# Patient Record
Sex: MALE | Race: BLACK OR AFRICAN AMERICAN | NOT HISPANIC OR LATINO | Employment: FULL TIME | URBAN - NONMETROPOLITAN AREA
[De-identification: names, ages, dates, MRNs, and addresses within clinical notes are randomized per-mention and may not be internally consistent; named-entity substitution may affect disease eponyms.]

---

## 2023-11-17 ENCOUNTER — OFFICE VISIT (OUTPATIENT)
Dept: FAMILY MEDICINE CLINIC | Facility: CLINIC | Age: 23
End: 2023-11-17
Payer: COMMERCIAL

## 2023-11-17 VITALS
TEMPERATURE: 97.8 F | RESPIRATION RATE: 18 BRPM | SYSTOLIC BLOOD PRESSURE: 120 MMHG | HEART RATE: 79 BPM | WEIGHT: 162.4 LBS | DIASTOLIC BLOOD PRESSURE: 62 MMHG | BODY MASS INDEX: 22.73 KG/M2 | HEIGHT: 71 IN | OXYGEN SATURATION: 98 %

## 2023-11-17 DIAGNOSIS — Z00.00 ENCOUNTER FOR WELL ADULT EXAM WITHOUT ABNORMAL FINDINGS: ICD-10-CM

## 2023-11-17 DIAGNOSIS — R19.7 DIARRHEA IN ADULT PATIENT: Primary | ICD-10-CM

## 2023-11-17 DIAGNOSIS — J30.89 ENVIRONMENTAL AND SEASONAL ALLERGIES: ICD-10-CM

## 2023-11-17 RX ORDER — LORATADINE 10 MG/1
10 TABLET ORAL DAILY
Qty: 30 TABLET | Refills: 2 | Status: SHIPPED | OUTPATIENT
Start: 2023-11-17

## 2023-11-17 NOTE — ASSESSMENT & PLAN NOTE
"Abdominal Pain: Patient complains of abdominal pain that started on Sunday and by Tuesday was resolving. The pain is located in the lower abdomen. The pain is described as cramping, and is 6/10 in intensity. Onset was 5 days ago with resolution today. Symptoms have been gradually improving since and patient denies any hematochezia. Aggravating factors, none known.  Alleviating factors, patient's grandmother gave him an over-the-counter \"acid reducer medicine\" and this seemed to help his cramping and lower abdominal pain.  Patient reported some mild loss of appetite but reports this is completely returned to normal.  Patient has no other symptoms denies any fever, chills, or other constitutional symptoms and is back to his regular daily activities.  Patient education materials attached to [facility] and reviewed with patient during his visit today about home management of diarrhea and GI upset.  Discussed dehydration symptoms as well.    Patient agrees to schedule follow-up in the coming weeks for annual wellness exam  "

## 2023-11-17 NOTE — ASSESSMENT & PLAN NOTE
"Patient complains of allergic rhinitis history and fullness in his ears  Patient past history of \"allergy medicine\" but states he has not taken any in quite some time  Patient agreeable to loratadine for allergy symptoms    "

## 2023-11-17 NOTE — PROGRESS NOTES
"     Acute Office Visit      Patient Name: Joseph Castillo  : 2000   MRN: [id number]     Chief Complaint:    Chief Complaint   Patient presents with    Abdominal Pain     Patient complains of stomach pain with some diarrhea. Patient states that the symptoms started on  and lasted until Tuesday. Patient states he is in no pain today.        History of Present Illness: Joseph Castillo is a 22 y.o. male who is here today for history of stomach pain associated with diarrhea and cramping.    Abdominal Pain: Patient complains of abdominal pain that started on  and by Tuesday was resolving. The pain is located in the lower abdomen. The pain is described as cramping, and is 6/10 in intensity. Onset was 5 days ago with resolution today. Symptoms have been gradually improving since and patient denies any hematochezia. Aggravating factors, none known.  Alleviating factors, patient's grandmother gave him an over-the-counter \"acid reducer medicine\" and this seemed to help his cramping and lower abdominal pain.  Patient reported some mild loss of appetite but reports this is completely returned to normal.  Patient has no other symptoms denies any fever, chills, or other constitutional symptoms and is back to his regular daily activities.    Subjective      Review of Systems:   Review of Systems   Constitutional:  Negative for chills, fever and unexpected weight loss.   HENT:  Positive for congestion (Patient complains of ears feeling full and and intermittent \"popping\" sensation in his ears, but denies pain).    Respiratory:  Negative for cough and shortness of breath.    Cardiovascular:  Negative for chest pain, palpitations and leg swelling.   Gastrointestinal:  Negative for abdominal pain, constipation and diarrhea.   Neurological: Negative.    Psychiatric/Behavioral:  Negative for suicidal ideas.        Medications:     Current Outpatient Medications:     loratadine (Claritin) 10 MG tablet, Take 1 tablet by " "mouth Daily., Disp: 30 tablet, Rfl: 2    Allergies:   No Known Allergies    Objective     Physical Exam:  Vital Signs:   Vitals:    11/17/23 1306   BP: 120/62   BP Location: Left arm   Patient Position: Sitting   Cuff Size: Adult   Pulse: 79   Resp: 18   Temp: 97.8 °F (36.6 °C)   TempSrc: Oral   SpO2: 98%   Weight: 73.7 kg (162 lb 6.4 oz)   Height: 180.3 cm (71\")   PainSc: 0-No pain     Body mass index is 22.65 kg/m².     Physical Exam  Vitals and nursing note reviewed.   Constitutional:       General: He is not in acute distress.     Appearance: Normal appearance. He is not toxic-appearing.   HENT:      Head: Normocephalic.      Right Ear: A middle ear effusion is present.      Left Ear: A middle ear effusion is present.   Eyes:      Pupils: Pupils are equal, round, and reactive to light.   Cardiovascular:      Rate and Rhythm: Normal rate and regular rhythm.      Heart sounds: No murmur heard.  Pulmonary:      Effort: Pulmonary effort is normal. No respiratory distress.      Breath sounds: Normal breath sounds.   Musculoskeletal:         General: Normal range of motion.      Cervical back: Normal range of motion and neck supple.      Right lower leg: No edema.      Left lower leg: No edema.   Skin:     General: Skin is warm and dry.   Neurological:      Mental Status: He is alert.   Psychiatric:         Mood and Affect: Mood normal.         Behavior: Behavior normal.       Procedures    PHQ-9 Total Score: 0     No results found for this or any previous visit.     Assessment / Plan      Assessment/Plan:   Diagnoses and all orders for this visit:    1. Diarrhea in adult patient (Primary)  Assessment & Plan:  Abdominal Pain: Patient complains of abdominal pain that started on Sunday and by Tuesday was resolving. The pain is located in the lower abdomen. The pain is described as cramping, and is 6/10 in intensity. Onset was 5 days ago with resolution today. Symptoms have been gradually improving since and patient " "denies any hematochezia. Aggravating factors, none known.  Alleviating factors, patient's grandmother gave him an over-the-counter \"acid reducer medicine\" and this seemed to help his cramping and lower abdominal pain.  Patient reported some mild loss of appetite but reports this is completely returned to normal.  Patient has no other symptoms denies any fever, chills, or other constitutional symptoms and is back to his regular daily activities.  Patient education materials attached to [employer] [facility] and reviewed with patient during his visit today about home management of diarrhea and GI upset.  Discussed dehydration symptoms as well.    Patient agrees to schedule follow-up in the coming weeks for annual wellness exam      2. Environmental and seasonal allergies  Assessment & Plan:  Patient complains of allergic rhinitis history and fullness in his ears  Patient past history of \"allergy medicine\" but states he has not taken any in quite some time  Patient agreeable to loratadine for allergy symptoms      Orders:  -     loratadine (Claritin) 10 MG tablet; Take 1 tablet by mouth Daily.  Dispense: 30 tablet; Refill: 2     Follow Up:   Return fasting blood work 1 week before physical exam, for Annual physical.        SHYAM Wang  [facility]     "

## 2023-12-04 ENCOUNTER — LAB (OUTPATIENT)
Dept: FAMILY MEDICINE CLINIC | Facility: CLINIC | Age: 23
End: 2023-12-04
Payer: COMMERCIAL

## 2023-12-04 DIAGNOSIS — Z00.00 ENCOUNTER FOR WELL ADULT EXAM WITHOUT ABNORMAL FINDINGS: ICD-10-CM

## 2023-12-05 LAB
ALBUMIN SERPL-MCNC: 5.2 G/DL (ref 4.3–5.2)
ALBUMIN/GLOB SERPL: 2.4 {RATIO} (ref 1.2–2.2)
ALP SERPL-CCNC: 51 IU/L (ref 44–121)
ALT SERPL-CCNC: 84 IU/L (ref 0–44)
AST SERPL-CCNC: 41 IU/L (ref 0–40)
BASOPHILS # BLD AUTO: 0 X10E3/UL (ref 0–0.2)
BASOPHILS NFR BLD AUTO: 0 %
BILIRUB SERPL-MCNC: 1 MG/DL (ref 0–1.2)
BUN SERPL-MCNC: 7 MG/DL (ref 6–20)
BUN/CREAT SERPL: 7 (ref 9–20)
CALCIUM SERPL-MCNC: 10 MG/DL (ref 8.7–10.2)
CHLORIDE SERPL-SCNC: 100 MMOL/L (ref 96–106)
CHOLEST SERPL-MCNC: 166 MG/DL (ref 100–199)
CO2 SERPL-SCNC: 25 MMOL/L (ref 20–29)
CREAT SERPL-MCNC: 1.01 MG/DL (ref 0.76–1.27)
EGFRCR SERPLBLD CKD-EPI 2021: 109 ML/MIN/1.73
EOSINOPHIL # BLD AUTO: 0 X10E3/UL (ref 0–0.4)
EOSINOPHIL NFR BLD AUTO: 1 %
ERYTHROCYTE [DISTWIDTH] IN BLOOD BY AUTOMATED COUNT: 12.4 % (ref 11.6–15.4)
GLOBULIN SER CALC-MCNC: 2.2 G/DL (ref 1.5–4.5)
GLUCOSE SERPL-MCNC: 89 MG/DL (ref 70–99)
HBA1C MFR BLD: 5.4 % (ref 4.8–5.6)
HCT VFR BLD AUTO: 45.7 % (ref 37.5–51)
HDLC SERPL-MCNC: 57 MG/DL
HGB BLD-MCNC: 15.7 G/DL (ref 13–17.7)
IMM GRANULOCYTES # BLD AUTO: 0 X10E3/UL (ref 0–0.1)
IMM GRANULOCYTES NFR BLD AUTO: 1 %
LDLC SERPL CALC-MCNC: 99 MG/DL (ref 0–99)
LYMPHOCYTES # BLD AUTO: 1.8 X10E3/UL (ref 0.7–3.1)
LYMPHOCYTES NFR BLD AUTO: 33 %
MCH RBC QN AUTO: 31.3 PG (ref 26.6–33)
MCHC RBC AUTO-ENTMCNC: 34.4 G/DL (ref 31.5–35.7)
MCV RBC AUTO: 91 FL (ref 79–97)
MONOCYTES # BLD AUTO: 0.5 X10E3/UL (ref 0.1–0.9)
MONOCYTES NFR BLD AUTO: 9 %
NEUTROPHILS # BLD AUTO: 3.1 X10E3/UL (ref 1.4–7)
NEUTROPHILS NFR BLD AUTO: 56 %
PLATELET # BLD AUTO: 220 X10E3/UL (ref 150–450)
POTASSIUM SERPL-SCNC: 4.3 MMOL/L (ref 3.5–5.2)
PROT SERPL-MCNC: 7.4 G/DL (ref 6–8.5)
RBC # BLD AUTO: 5.02 X10E6/UL (ref 4.14–5.8)
SODIUM SERPL-SCNC: 140 MMOL/L (ref 134–144)
T4 FREE SERPL-MCNC: 1.24 NG/DL (ref 0.82–1.77)
TRIGL SERPL-MCNC: 47 MG/DL (ref 0–149)
TSH SERPL DL<=0.005 MIU/L-ACNC: 6.09 UIU/ML (ref 0.45–4.5)
VLDLC SERPL CALC-MCNC: 10 MG/DL (ref 5–40)
WBC # BLD AUTO: 5.5 X10E3/UL (ref 3.4–10.8)

## 2023-12-11 ENCOUNTER — OFFICE VISIT (OUTPATIENT)
Dept: FAMILY MEDICINE CLINIC | Facility: CLINIC | Age: 23
End: 2023-12-11
Payer: COMMERCIAL

## 2023-12-11 VITALS
HEIGHT: 71 IN | DIASTOLIC BLOOD PRESSURE: 78 MMHG | WEIGHT: 179.5 LBS | OXYGEN SATURATION: 98 % | BODY MASS INDEX: 25.13 KG/M2 | SYSTOLIC BLOOD PRESSURE: 120 MMHG | HEART RATE: 62 BPM

## 2023-12-11 DIAGNOSIS — R19.7 DIARRHEA IN ADULT PATIENT: ICD-10-CM

## 2023-12-11 DIAGNOSIS — J30.89 ENVIRONMENTAL AND SEASONAL ALLERGIES: ICD-10-CM

## 2023-12-11 DIAGNOSIS — Z23 NEED FOR TDAP VACCINATION: ICD-10-CM

## 2023-12-11 DIAGNOSIS — R89.9 ABNORMAL LABORATORY TEST RESULT: Primary | ICD-10-CM

## 2023-12-11 PROBLEM — Z00.01 ENCOUNTER FOR GENERAL ADULT MEDICAL EXAMINATION WITH ABNORMAL FINDINGS: Status: ACTIVE | Noted: 2023-12-11

## 2023-12-11 NOTE — ASSESSMENT & PLAN NOTE
Complaints of diarrhea last visit, patient otherwise healthy  Abnormal AST and ALT, plan for ultrasound of gallbladder and liver to r/o pathology and will recheck lab work in 6 months unless patient develops additional or worsening symptoms

## 2023-12-11 NOTE — PATIENT INSTRUCTIONS

## 2023-12-11 NOTE — ASSESSMENT & PLAN NOTE
Pt reports adequate symptoms control with current medication therapy  No engine treatment plan today

## 2024-06-11 ENCOUNTER — OFFICE VISIT (OUTPATIENT)
Dept: FAMILY MEDICINE CLINIC | Facility: CLINIC | Age: 24
End: 2024-06-11
Payer: COMMERCIAL

## 2024-06-11 VITALS
BODY MASS INDEX: 25.54 KG/M2 | HEIGHT: 71 IN | RESPIRATION RATE: 16 BRPM | DIASTOLIC BLOOD PRESSURE: 68 MMHG | SYSTOLIC BLOOD PRESSURE: 114 MMHG | HEART RATE: 75 BPM | WEIGHT: 182.4 LBS | OXYGEN SATURATION: 98 %

## 2024-06-11 DIAGNOSIS — R89.9 ABNORMAL LABORATORY TEST RESULT: Primary | ICD-10-CM

## 2024-06-11 DIAGNOSIS — J30.89 ENVIRONMENTAL AND SEASONAL ALLERGIES: ICD-10-CM

## 2024-06-11 DIAGNOSIS — Z11.59 NEED FOR HEPATITIS C SCREENING TEST: ICD-10-CM

## 2024-06-11 PROCEDURE — 1126F AMNT PAIN NOTED NONE PRSNT: CPT

## 2024-06-11 PROCEDURE — 99213 OFFICE O/P EST LOW 20 MIN: CPT

## 2024-06-11 PROCEDURE — 1159F MED LIST DOCD IN RCRD: CPT

## 2024-06-11 PROCEDURE — 36415 COLL VENOUS BLD VENIPUNCTURE: CPT

## 2024-06-11 PROCEDURE — 1160F RVW MEDS BY RX/DR IN RCRD: CPT

## 2024-06-11 NOTE — PROGRESS NOTES
Follow Up Office Visit      Date:  2024   Patient Name: Joseph Castillo  : 2000   MRN: [id number]     Chief Complaint:    Chief Complaint   Patient presents with    Follow-up     6 month follow up        History of Present Illness: Joseph Castillo is a 23 y.o. male who is here today for 6 month follow up. Pt here to follow up after having abnormal diagnostic testing at his previous appointment.  Patient was seen in 2023 and had elevated liver enzymes, he was scheduled for ultrasound in January.  He is here today for 6-month follow-up, patient denies illness or injury since last visit. Denies falls, unexplained weight change, fevers, chills, or other constitutional symptoms and has no additional questions or concens at this time.    Subjective      History reviewed. No pertinent past medical history.    History reviewed. No pertinent surgical history.    History reviewed. No pertinent family history.    Social History     Socioeconomic History    Marital status: Single   Tobacco Use    Smoking status: Some Days     Current packs/day: 0.25     Types: Cigarettes    Smokeless tobacco: Never   Vaping Use    Vaping status: Some Days    Substances: Nicotine, CBD    Devices: Disposable   Substance and Sexual Activity    Alcohol use: Never     Comment: Occasionally drinks 1 drink while golfing    Drug use: Never    Sexual activity: Yes     Partners: Female     Birth control/protection: Condom     Comment: Pt denies same sex partners - error with his online questionnaire         Current Outpatient Medications:     loratadine (Claritin) 10 MG tablet, Take 1 tablet by mouth Daily. (Patient not taking: Reported on 2024), Disp: 30 tablet, Rfl: 2    No Known Allergies    Objective     Physical Exam  Vitals and nursing note reviewed.   Constitutional:       General: He is not in acute distress.     Appearance: Normal appearance. He is not toxic-appearing.   HENT:      Head: Normocephalic.   Eyes:       "Pupils: Pupils are equal, round, and reactive to light.   Cardiovascular:      Rate and Rhythm: Normal rate and regular rhythm.      Heart sounds: No murmur heard.  Pulmonary:      Effort: Pulmonary effort is normal. No respiratory distress.      Breath sounds: Normal breath sounds.   Abdominal:      General: Bowel sounds are normal.      Tenderness: There is no abdominal tenderness.   Musculoskeletal:         General: Normal range of motion.      Cervical back: Normal range of motion and neck supple.      Right lower leg: No edema.      Left lower leg: No edema.   Skin:     General: Skin is warm and dry.   Neurological:      Mental Status: He is alert.   Psychiatric:         Mood and Affect: Mood normal.         Behavior: Behavior normal.       Vitals:    06/11/24 0838   BP: 114/68   BP Location: Left arm   Patient Position: Sitting   Cuff Size: Large Adult   Pulse: 75   Resp: 16   SpO2: 98%   Weight: 82.7 kg (182 lb 6.4 oz)   Height: 180.3 cm (70.98\")   PainSc: 0-No pain     Body mass index is 25.45 kg/m².          PHQ-9 Total Score: 0     Assessment / Plan      Diagnoses and all orders for this visit:    1. Abnormal laboratory test result (Primary)  Assessment & Plan:  Abnormal liver enzymes and elevated TSH at initial visit in December 2023.  Patient was scheduled for ultrasound in January but was no-show-he is here today for 6-month follow-up.  Patient did complete lab work prior to his visit today which shows that liver functions and TSH have returned to normal.  Patient reports he is doing well otherwise and has no concerns today.  No change in treatment regimen at this time, we will recheck routine blood work in 6 months unless otherwise indicated.    Orders:  -     CBC Auto Differential  -     Comprehensive Metabolic Panel  -     TSH Rfx On Abnormal To Free T4    2. Need for hepatitis C screening test  -     Hepatitis C Antibody; Future  -     Hepatitis C Antibody    3. Environmental and seasonal " allergies  Assessment & Plan:  Symptoms of environmental allergies well-controlled on current treatment regimen.  No change in treatment plan at this time.  Will re-evaluate as needed and/or at routine follow-up visits      Most recent diagnostic testing results were reviewed and discussed with the patient during their appointment today and all questions addressed to patient's satisfaction.     Return in about 6 months (around 12/11/2024) for Annual physical, - Patient will need labs before next visit please.    SHYAM Wang (Libby) [facility]  [address] [place] [id number]  [phone]    NOTE TO PATIENT: The 21st Century Cures Act makes medical notes like these available to patients in the interest of transparency. However, be advised this is a medical document. It is intended as peer to peer communication. It is written in medical language and may contain abbreviations or verbiage that are unfamiliar. It may appear blunt or direct. Medical documents are intended to carry relevant information, facts as evident, and the clinical opinion of the practitioner.     EMR Dragon/Transcription disclaimer:  Much of this encounter note is an electronic transcription of spoken language to printed text. Electronic transcription of spoken language may permit erroneous, or at times, nonsensical words or phrases to be inadvertently transcribed. Although I have reviewed the note for such errors, some may still exist.

## 2024-06-11 NOTE — PATIENT INSTRUCTIONS

## 2024-06-12 LAB
ALBUMIN SERPL-MCNC: 5 G/DL (ref 4.3–5.2)
ALBUMIN/GLOB SERPL: 2.4 {RATIO}
ALP SERPL-CCNC: 51 IU/L (ref 44–121)
ALT SERPL-CCNC: 22 IU/L (ref 0–44)
AST SERPL-CCNC: 34 IU/L (ref 0–40)
BASOPHILS # BLD AUTO: 0 X10E3/UL (ref 0–0.2)
BASOPHILS NFR BLD AUTO: 0 %
BILIRUB SERPL-MCNC: 1 MG/DL (ref 0–1.2)
BUN SERPL-MCNC: 8 MG/DL (ref 6–20)
BUN/CREAT SERPL: 8 (ref 9–20)
CALCIUM SERPL-MCNC: 9.8 MG/DL (ref 8.7–10.2)
CHLORIDE SERPL-SCNC: 102 MMOL/L (ref 96–106)
CO2 SERPL-SCNC: 24 MMOL/L (ref 20–29)
CREAT SERPL-MCNC: 1.06 MG/DL (ref 0.76–1.27)
EGFRCR SERPLBLD CKD-EPI 2021: 102 ML/MIN/1.73
EOSINOPHIL # BLD AUTO: 0 X10E3/UL (ref 0–0.4)
EOSINOPHIL NFR BLD AUTO: 0 %
ERYTHROCYTE [DISTWIDTH] IN BLOOD BY AUTOMATED COUNT: 12.2 % (ref 11.6–15.4)
GLOBULIN SER CALC-MCNC: 2.1 G/DL (ref 1.5–4.5)
GLUCOSE SERPL-MCNC: 90 MG/DL (ref 70–99)
HCT VFR BLD AUTO: 48 % (ref 37.5–51)
HCV IGG SERPL QL IA: NON REACTIVE
HGB BLD-MCNC: 16.2 G/DL (ref 13–17.7)
IMM GRANULOCYTES # BLD AUTO: 0 X10E3/UL (ref 0–0.1)
IMM GRANULOCYTES NFR BLD AUTO: 0 %
LYMPHOCYTES # BLD AUTO: 1.1 X10E3/UL (ref 0.7–3.1)
LYMPHOCYTES NFR BLD AUTO: 22 %
MCH RBC QN AUTO: 31.2 PG (ref 26.6–33)
MCHC RBC AUTO-ENTMCNC: 33.8 G/DL (ref 31.5–35.7)
MCV RBC AUTO: 93 FL (ref 79–97)
MONOCYTES # BLD AUTO: 0.6 X10E3/UL (ref 0.1–0.9)
MONOCYTES NFR BLD AUTO: 12 %
NEUTROPHILS # BLD AUTO: 3.1 X10E3/UL (ref 1.4–7)
NEUTROPHILS NFR BLD AUTO: 66 %
PLATELET # BLD AUTO: 186 X10E3/UL (ref 150–450)
POTASSIUM SERPL-SCNC: 4.2 MMOL/L (ref 3.5–5.2)
PROT SERPL-MCNC: 7.1 G/DL (ref 6–8.5)
RBC # BLD AUTO: 5.19 X10E6/UL (ref 4.14–5.8)
SODIUM SERPL-SCNC: 142 MMOL/L (ref 134–144)
TSH SERPL DL<=0.005 MIU/L-ACNC: 2.45 UIU/ML (ref 0.45–4.5)
WBC # BLD AUTO: 4.8 X10E3/UL (ref 3.4–10.8)

## 2024-06-30 NOTE — ASSESSMENT & PLAN NOTE
Symptoms of environmental allergies well-controlled on current treatment regimen.  No change in treatment plan at this time.  Will re-evaluate as needed and/or at routine follow-up visits

## 2024-06-30 NOTE — ASSESSMENT & PLAN NOTE
Abnormal liver enzymes and elevated TSH at initial visit in December 2023.  Patient was scheduled for ultrasound in January but was no-show-he is here today for 6-month follow-up.  Patient did complete lab work prior to his visit today which shows that liver functions and TSH have returned to normal.  Patient reports he is doing well otherwise and has no concerns today.  No change in treatment regimen at this time, we will recheck routine blood work in 6 months unless otherwise indicated.